# Patient Record
Sex: MALE | Race: WHITE | NOT HISPANIC OR LATINO | Employment: OTHER | URBAN - METROPOLITAN AREA
[De-identification: names, ages, dates, MRNs, and addresses within clinical notes are randomized per-mention and may not be internally consistent; named-entity substitution may affect disease eponyms.]

---

## 2023-10-09 ENCOUNTER — APPOINTMENT (OUTPATIENT)
Dept: ADMISSIONS | Facility: MEDICAL CENTER | Age: 53
End: 2023-10-09
Attending: NEUROLOGICAL SURGERY
Payer: COMMERCIAL

## 2023-10-31 ENCOUNTER — PRE-ADMISSION TESTING (OUTPATIENT)
Dept: ADMISSIONS | Facility: MEDICAL CENTER | Age: 53
End: 2023-10-31
Attending: NEUROLOGICAL SURGERY
Payer: COMMERCIAL

## 2023-10-31 RX ORDER — OXYCODONE AND ACETAMINOPHEN 10; 325 MG/1; MG/1
1 TABLET ORAL 4 TIMES DAILY PRN
COMMUNITY
Start: 2023-09-29

## 2023-10-31 RX ORDER — PREGABALIN 150 MG/1
150 CAPSULE ORAL
COMMUNITY
Start: 2023-09-29

## 2023-10-31 RX ORDER — MORPHINE SULFATE 30 MG/1
30 TABLET, FILM COATED, EXTENDED RELEASE ORAL 2 TIMES DAILY
COMMUNITY
Start: 2023-09-29

## 2023-10-31 RX ORDER — PREGABALIN 150 MG/1
150 CAPSULE ORAL DAILY
COMMUNITY
End: 2023-10-31

## 2023-11-15 ENCOUNTER — APPOINTMENT (OUTPATIENT)
Dept: RADIOLOGY | Facility: MEDICAL CENTER | Age: 53
End: 2023-11-15
Attending: NEUROLOGICAL SURGERY
Payer: COMMERCIAL

## 2023-11-15 ENCOUNTER — ANESTHESIA EVENT (OUTPATIENT)
Dept: SURGERY | Facility: MEDICAL CENTER | Age: 53
End: 2023-11-15
Payer: COMMERCIAL

## 2023-11-15 ENCOUNTER — ANESTHESIA (OUTPATIENT)
Dept: SURGERY | Facility: MEDICAL CENTER | Age: 53
End: 2023-11-15
Payer: COMMERCIAL

## 2023-11-15 ENCOUNTER — HOSPITAL ENCOUNTER (OUTPATIENT)
Facility: MEDICAL CENTER | Age: 53
End: 2023-11-16
Attending: NEUROLOGICAL SURGERY | Admitting: NEUROLOGICAL SURGERY
Payer: COMMERCIAL

## 2023-11-15 PROBLEM — M48.02 CERVICAL STENOSIS OF SPINAL CANAL: Status: ACTIVE | Noted: 2023-11-15

## 2023-11-15 LAB
ABO + RH BLD: NORMAL
ABO GROUP BLD: NORMAL
BLD GP AB SCN SERPL QL: NORMAL
RH BLD: NORMAL

## 2023-11-15 PROCEDURE — 700111 HCHG RX REV CODE 636 W/ 250 OVERRIDE (IP): Mod: JZ | Performed by: ANESTHESIOLOGY

## 2023-11-15 PROCEDURE — 110454 HCHG SHELL REV 250: Performed by: NEUROLOGICAL SURGERY

## 2023-11-15 PROCEDURE — 700101 HCHG RX REV CODE 250: Performed by: ANESTHESIOLOGY

## 2023-11-15 PROCEDURE — 86900 BLOOD TYPING SEROLOGIC ABO: CPT

## 2023-11-15 PROCEDURE — 700111 HCHG RX REV CODE 636 W/ 250 OVERRIDE (IP): Performed by: ANESTHESIOLOGY

## 2023-11-15 PROCEDURE — A9270 NON-COVERED ITEM OR SERVICE: HCPCS | Performed by: ANESTHESIOLOGY

## 2023-11-15 PROCEDURE — A9270 NON-COVERED ITEM OR SERVICE: HCPCS | Performed by: NURSE PRACTITIONER

## 2023-11-15 PROCEDURE — 700105 HCHG RX REV CODE 258: Performed by: NEUROLOGICAL SURGERY

## 2023-11-15 PROCEDURE — 700102 HCHG RX REV CODE 250 W/ 637 OVERRIDE(OP): Performed by: ANESTHESIOLOGY

## 2023-11-15 PROCEDURE — C1713 ANCHOR/SCREW BN/BN,TIS/BN: HCPCS | Performed by: NEUROLOGICAL SURGERY

## 2023-11-15 PROCEDURE — 160048 HCHG OR STATISTICAL LEVEL 1-5: Performed by: NEUROLOGICAL SURGERY

## 2023-11-15 PROCEDURE — 700111 HCHG RX REV CODE 636 W/ 250 OVERRIDE (IP): Mod: JZ | Performed by: NEUROLOGICAL SURGERY

## 2023-11-15 PROCEDURE — 86850 RBC ANTIBODY SCREEN: CPT

## 2023-11-15 PROCEDURE — 700101 HCHG RX REV CODE 250: Performed by: NURSE PRACTITIONER

## 2023-11-15 PROCEDURE — 160036 HCHG PACU - EA ADDL 30 MINS PHASE I: Performed by: NEUROLOGICAL SURGERY

## 2023-11-15 PROCEDURE — 160035 HCHG PACU - 1ST 60 MINS PHASE I: Performed by: NEUROLOGICAL SURGERY

## 2023-11-15 PROCEDURE — 160041 HCHG SURGERY MINUTES - EA ADDL 1 MIN LEVEL 4: Performed by: NEUROLOGICAL SURGERY

## 2023-11-15 PROCEDURE — 700102 HCHG RX REV CODE 250 W/ 637 OVERRIDE(OP): Performed by: NURSE PRACTITIONER

## 2023-11-15 PROCEDURE — 700101 HCHG RX REV CODE 250: Performed by: NEUROLOGICAL SURGERY

## 2023-11-15 PROCEDURE — 72040 X-RAY EXAM NECK SPINE 2-3 VW: CPT

## 2023-11-15 PROCEDURE — 160002 HCHG RECOVERY MINUTES (STAT): Performed by: NEUROLOGICAL SURGERY

## 2023-11-15 PROCEDURE — 160029 HCHG SURGERY MINUTES - 1ST 30 MINS LEVEL 4: Performed by: NEUROLOGICAL SURGERY

## 2023-11-15 PROCEDURE — 96365 THER/PROPH/DIAG IV INF INIT: CPT

## 2023-11-15 PROCEDURE — 160009 HCHG ANES TIME/MIN: Performed by: NEUROLOGICAL SURGERY

## 2023-11-15 PROCEDURE — 700111 HCHG RX REV CODE 636 W/ 250 OVERRIDE (IP): Performed by: NURSE PRACTITIONER

## 2023-11-15 PROCEDURE — 110371 HCHG SHELL REV 272: Performed by: NEUROLOGICAL SURGERY

## 2023-11-15 PROCEDURE — 86901 BLOOD TYPING SEROLOGIC RH(D): CPT

## 2023-11-15 PROCEDURE — 700105 HCHG RX REV CODE 258: Performed by: NURSE PRACTITIONER

## 2023-11-15 PROCEDURE — G0378 HOSPITAL OBSERVATION PER HR: HCPCS

## 2023-11-15 DEVICE — DISC 6 X 16 MM PRESTIGE: Type: IMPLANTABLE DEVICE | Status: FUNCTIONAL

## 2023-11-15 RX ORDER — DIPHENHYDRAMINE HYDROCHLORIDE 50 MG/ML
12.5 INJECTION INTRAMUSCULAR; INTRAVENOUS
Status: DISCONTINUED | OUTPATIENT
Start: 2023-11-15 | End: 2023-11-15 | Stop reason: HOSPADM

## 2023-11-15 RX ORDER — HYDROMORPHONE HYDROCHLORIDE 1 MG/ML
0.1 INJECTION, SOLUTION INTRAMUSCULAR; INTRAVENOUS; SUBCUTANEOUS
Status: DISCONTINUED | OUTPATIENT
Start: 2023-11-15 | End: 2023-11-15 | Stop reason: HOSPADM

## 2023-11-15 RX ORDER — MIDAZOLAM HYDROCHLORIDE 1 MG/ML
INJECTION INTRAMUSCULAR; INTRAVENOUS PRN
Status: DISCONTINUED | OUTPATIENT
Start: 2023-11-15 | End: 2023-11-15 | Stop reason: SURG

## 2023-11-15 RX ORDER — LEVOTHYROXINE SODIUM 0.1 MG/1
100 TABLET ORAL DAILY
Status: DISCONTINUED | OUTPATIENT
Start: 2023-11-16 | End: 2023-11-16 | Stop reason: HOSPADM

## 2023-11-15 RX ORDER — SODIUM CHLORIDE, SODIUM LACTATE, POTASSIUM CHLORIDE, CALCIUM CHLORIDE 600; 310; 30; 20 MG/100ML; MG/100ML; MG/100ML; MG/100ML
INJECTION, SOLUTION INTRAVENOUS CONTINUOUS
Status: ACTIVE | OUTPATIENT
Start: 2023-11-15 | End: 2023-11-15

## 2023-11-15 RX ORDER — LABETALOL HYDROCHLORIDE 5 MG/ML
10 INJECTION, SOLUTION INTRAVENOUS
Status: DISCONTINUED | OUTPATIENT
Start: 2023-11-15 | End: 2023-11-16 | Stop reason: HOSPADM

## 2023-11-15 RX ORDER — ONDANSETRON 4 MG/1
4 TABLET, ORALLY DISINTEGRATING ORAL EVERY 4 HOURS PRN
Status: DISCONTINUED | OUTPATIENT
Start: 2023-11-15 | End: 2023-11-16 | Stop reason: HOSPADM

## 2023-11-15 RX ORDER — DEXAMETHASONE SODIUM PHOSPHATE 4 MG/ML
INJECTION, SOLUTION INTRA-ARTICULAR; INTRALESIONAL; INTRAMUSCULAR; INTRAVENOUS; SOFT TISSUE PRN
Status: DISCONTINUED | OUTPATIENT
Start: 2023-11-15 | End: 2023-11-15 | Stop reason: SURG

## 2023-11-15 RX ORDER — PROMETHAZINE HYDROCHLORIDE 25 MG/1
12.5-25 TABLET ORAL EVERY 4 HOURS PRN
Status: DISCONTINUED | OUTPATIENT
Start: 2023-11-15 | End: 2023-11-16 | Stop reason: HOSPADM

## 2023-11-15 RX ORDER — DOCUSATE SODIUM 100 MG/1
100 CAPSULE, LIQUID FILLED ORAL 2 TIMES DAILY
Status: DISCONTINUED | OUTPATIENT
Start: 2023-11-15 | End: 2023-11-16 | Stop reason: HOSPADM

## 2023-11-15 RX ORDER — HYDRALAZINE HYDROCHLORIDE 20 MG/ML
5 INJECTION INTRAMUSCULAR; INTRAVENOUS
Status: DISCONTINUED | OUTPATIENT
Start: 2023-11-15 | End: 2023-11-15 | Stop reason: HOSPADM

## 2023-11-15 RX ORDER — ONDANSETRON 2 MG/ML
4 INJECTION INTRAMUSCULAR; INTRAVENOUS EVERY 4 HOURS PRN
Status: DISCONTINUED | OUTPATIENT
Start: 2023-11-15 | End: 2023-11-16 | Stop reason: HOSPADM

## 2023-11-15 RX ORDER — HYDROMORPHONE HYDROCHLORIDE 2 MG/ML
INJECTION, SOLUTION INTRAMUSCULAR; INTRAVENOUS; SUBCUTANEOUS PRN
Status: DISCONTINUED | OUTPATIENT
Start: 2023-11-15 | End: 2023-11-15 | Stop reason: SURG

## 2023-11-15 RX ORDER — TIZANIDINE 4 MG/1
4 TABLET ORAL 3 TIMES DAILY PRN
Status: DISCONTINUED | OUTPATIENT
Start: 2023-11-15 | End: 2023-11-16 | Stop reason: HOSPADM

## 2023-11-15 RX ORDER — HALOPERIDOL 5 MG/ML
1 INJECTION INTRAMUSCULAR
Status: DISCONTINUED | OUTPATIENT
Start: 2023-11-15 | End: 2023-11-15 | Stop reason: HOSPADM

## 2023-11-15 RX ORDER — HYDROMORPHONE HYDROCHLORIDE 1 MG/ML
0.2 INJECTION, SOLUTION INTRAMUSCULAR; INTRAVENOUS; SUBCUTANEOUS
Status: DISCONTINUED | OUTPATIENT
Start: 2023-11-15 | End: 2023-11-15 | Stop reason: HOSPADM

## 2023-11-15 RX ORDER — CEFAZOLIN SODIUM 1 G/3ML
INJECTION, POWDER, FOR SOLUTION INTRAMUSCULAR; INTRAVENOUS PRN
Status: DISCONTINUED | OUTPATIENT
Start: 2023-11-15 | End: 2023-11-15 | Stop reason: SURG

## 2023-11-15 RX ORDER — MEPERIDINE HYDROCHLORIDE 25 MG/ML
6.25 INJECTION INTRAMUSCULAR; INTRAVENOUS; SUBCUTANEOUS
Status: DISCONTINUED | OUTPATIENT
Start: 2023-11-15 | End: 2023-11-15 | Stop reason: HOSPADM

## 2023-11-15 RX ORDER — GABAPENTIN 300 MG/1
300 CAPSULE ORAL ONCE
Status: COMPLETED | OUTPATIENT
Start: 2023-11-15 | End: 2023-11-15

## 2023-11-15 RX ORDER — EPHEDRINE SULFATE 50 MG/ML
5 INJECTION, SOLUTION INTRAVENOUS
Status: DISCONTINUED | OUTPATIENT
Start: 2023-11-15 | End: 2023-11-15 | Stop reason: HOSPADM

## 2023-11-15 RX ORDER — ONDANSETRON 2 MG/ML
INJECTION INTRAMUSCULAR; INTRAVENOUS PRN
Status: DISCONTINUED | OUTPATIENT
Start: 2023-11-15 | End: 2023-11-15 | Stop reason: SURG

## 2023-11-15 RX ORDER — MORPHINE SULFATE 15 MG/1
30 TABLET, FILM COATED, EXTENDED RELEASE ORAL 2 TIMES DAILY
Status: DISCONTINUED | OUTPATIENT
Start: 2023-11-15 | End: 2023-11-16 | Stop reason: HOSPADM

## 2023-11-15 RX ORDER — METOPROLOL TARTRATE 1 MG/ML
1 INJECTION, SOLUTION INTRAVENOUS
Status: DISCONTINUED | OUTPATIENT
Start: 2023-11-15 | End: 2023-11-15 | Stop reason: HOSPADM

## 2023-11-15 RX ORDER — DIPHENHYDRAMINE HCL 25 MG
25 TABLET ORAL EVERY 6 HOURS PRN
Status: DISCONTINUED | OUTPATIENT
Start: 2023-11-15 | End: 2023-11-16 | Stop reason: HOSPADM

## 2023-11-15 RX ORDER — PROMETHAZINE HYDROCHLORIDE 25 MG/1
12.5-25 SUPPOSITORY RECTAL EVERY 4 HOURS PRN
Status: DISCONTINUED | OUTPATIENT
Start: 2023-11-15 | End: 2023-11-16 | Stop reason: HOSPADM

## 2023-11-15 RX ORDER — HYDROMORPHONE HYDROCHLORIDE 1 MG/ML
0.4 INJECTION, SOLUTION INTRAMUSCULAR; INTRAVENOUS; SUBCUTANEOUS
Status: DISCONTINUED | OUTPATIENT
Start: 2023-11-15 | End: 2023-11-15 | Stop reason: HOSPADM

## 2023-11-15 RX ORDER — OXYCODONE HYDROCHLORIDE 10 MG/1
10 TABLET ORAL
Status: DISCONTINUED | OUTPATIENT
Start: 2023-11-15 | End: 2023-11-16 | Stop reason: HOSPADM

## 2023-11-15 RX ORDER — CEFAZOLIN SODIUM 1 G/3ML
INJECTION, POWDER, FOR SOLUTION INTRAMUSCULAR; INTRAVENOUS
Status: DISCONTINUED | OUTPATIENT
Start: 2023-11-15 | End: 2023-11-15 | Stop reason: HOSPADM

## 2023-11-15 RX ORDER — OXYCODONE HCL 5 MG/5 ML
10 SOLUTION, ORAL ORAL
Status: COMPLETED | OUTPATIENT
Start: 2023-11-15 | End: 2023-11-15

## 2023-11-15 RX ORDER — OXYCODONE HCL 5 MG/5 ML
5 SOLUTION, ORAL ORAL
Status: COMPLETED | OUTPATIENT
Start: 2023-11-15 | End: 2023-11-15

## 2023-11-15 RX ORDER — SODIUM CHLORIDE AND POTASSIUM CHLORIDE 150; 900 MG/100ML; MG/100ML
INJECTION, SOLUTION INTRAVENOUS CONTINUOUS
Status: DISCONTINUED | OUTPATIENT
Start: 2023-11-15 | End: 2023-11-16 | Stop reason: HOSPADM

## 2023-11-15 RX ORDER — LIDOCAINE HYDROCHLORIDE 20 MG/ML
INJECTION, SOLUTION EPIDURAL; INFILTRATION; INTRACAUDAL; PERINEURAL PRN
Status: DISCONTINUED | OUTPATIENT
Start: 2023-11-15 | End: 2023-11-15 | Stop reason: SURG

## 2023-11-15 RX ORDER — BUPIVACAINE HYDROCHLORIDE AND EPINEPHRINE 5; 5 MG/ML; UG/ML
INJECTION, SOLUTION PERINEURAL
Status: DISCONTINUED | OUTPATIENT
Start: 2023-11-15 | End: 2023-11-15 | Stop reason: HOSPADM

## 2023-11-15 RX ORDER — ROCURONIUM BROMIDE 10 MG/ML
INJECTION, SOLUTION INTRAVENOUS PRN
Status: DISCONTINUED | OUTPATIENT
Start: 2023-11-15 | End: 2023-11-15 | Stop reason: SURG

## 2023-11-15 RX ORDER — AMOXICILLIN 250 MG
1 CAPSULE ORAL
Status: DISCONTINUED | OUTPATIENT
Start: 2023-11-15 | End: 2023-11-16 | Stop reason: HOSPADM

## 2023-11-15 RX ORDER — ACETAMINOPHEN 500 MG
1000 TABLET ORAL ONCE
Status: COMPLETED | OUTPATIENT
Start: 2023-11-15 | End: 2023-11-15

## 2023-11-15 RX ORDER — ENEMA 19; 7 G/133ML; G/133ML
1 ENEMA RECTAL
Status: DISCONTINUED | OUTPATIENT
Start: 2023-11-15 | End: 2023-11-16 | Stop reason: HOSPADM

## 2023-11-15 RX ORDER — POLYETHYLENE GLYCOL 3350 17 G/17G
1 POWDER, FOR SOLUTION ORAL 2 TIMES DAILY PRN
Status: DISCONTINUED | OUTPATIENT
Start: 2023-11-15 | End: 2023-11-16 | Stop reason: HOSPADM

## 2023-11-15 RX ORDER — LEVOTHYROXINE SODIUM 0.1 MG/1
100 TABLET ORAL DAILY
COMMUNITY
Start: 2023-11-06

## 2023-11-15 RX ORDER — MAGNESIUM HYDROXIDE 1200 MG/15ML
LIQUID ORAL
Status: COMPLETED | OUTPATIENT
Start: 2023-11-15 | End: 2023-11-15

## 2023-11-15 RX ORDER — PROCHLORPERAZINE EDISYLATE 5 MG/ML
5-10 INJECTION INTRAMUSCULAR; INTRAVENOUS EVERY 4 HOURS PRN
Status: DISCONTINUED | OUTPATIENT
Start: 2023-11-15 | End: 2023-11-16 | Stop reason: HOSPADM

## 2023-11-15 RX ORDER — SODIUM CHLORIDE, SODIUM LACTATE, POTASSIUM CHLORIDE, CALCIUM CHLORIDE 600; 310; 30; 20 MG/100ML; MG/100ML; MG/100ML; MG/100ML
INJECTION, SOLUTION INTRAVENOUS CONTINUOUS
Status: DISCONTINUED | OUTPATIENT
Start: 2023-11-15 | End: 2023-11-15 | Stop reason: HOSPADM

## 2023-11-15 RX ORDER — ONDANSETRON 2 MG/ML
4 INJECTION INTRAMUSCULAR; INTRAVENOUS
Status: DISCONTINUED | OUTPATIENT
Start: 2023-11-15 | End: 2023-11-15 | Stop reason: HOSPADM

## 2023-11-15 RX ORDER — HYDRALAZINE HYDROCHLORIDE 20 MG/ML
10 INJECTION INTRAMUSCULAR; INTRAVENOUS
Status: DISCONTINUED | OUTPATIENT
Start: 2023-11-15 | End: 2023-11-16 | Stop reason: HOSPADM

## 2023-11-15 RX ORDER — PREGABALIN 150 MG/1
150 CAPSULE ORAL
Status: DISCONTINUED | OUTPATIENT
Start: 2023-11-15 | End: 2023-11-16 | Stop reason: HOSPADM

## 2023-11-15 RX ORDER — AMOXICILLIN 250 MG
1 CAPSULE ORAL NIGHTLY
Status: DISCONTINUED | OUTPATIENT
Start: 2023-11-15 | End: 2023-11-16 | Stop reason: HOSPADM

## 2023-11-15 RX ORDER — OXYCODONE HYDROCHLORIDE 10 MG/1
20 TABLET ORAL
Status: DISCONTINUED | OUTPATIENT
Start: 2023-11-15 | End: 2023-11-16 | Stop reason: HOSPADM

## 2023-11-15 RX ORDER — HYDROMORPHONE HYDROCHLORIDE 1 MG/ML
1 INJECTION, SOLUTION INTRAMUSCULAR; INTRAVENOUS; SUBCUTANEOUS
Status: DISCONTINUED | OUTPATIENT
Start: 2023-11-15 | End: 2023-11-16 | Stop reason: HOSPADM

## 2023-11-15 RX ORDER — DIPHENHYDRAMINE HYDROCHLORIDE 50 MG/ML
25 INJECTION INTRAMUSCULAR; INTRAVENOUS EVERY 6 HOURS PRN
Status: DISCONTINUED | OUTPATIENT
Start: 2023-11-15 | End: 2023-11-16 | Stop reason: HOSPADM

## 2023-11-15 RX ORDER — BISACODYL 10 MG
10 SUPPOSITORY, RECTAL RECTAL
Status: DISCONTINUED | OUTPATIENT
Start: 2023-11-15 | End: 2023-11-16 | Stop reason: HOSPADM

## 2023-11-15 RX ADMIN — FENTANYL CITRATE 50 MCG: 50 INJECTION, SOLUTION INTRAMUSCULAR; INTRAVENOUS at 14:56

## 2023-11-15 RX ADMIN — GABAPENTIN 300 MG: 300 CAPSULE ORAL at 13:29

## 2023-11-15 RX ADMIN — SODIUM CHLORIDE, POTASSIUM CHLORIDE, SODIUM LACTATE AND CALCIUM CHLORIDE: 600; 310; 30; 20 INJECTION, SOLUTION INTRAVENOUS at 13:29

## 2023-11-15 RX ADMIN — ONDANSETRON 4 MG: 2 INJECTION INTRAMUSCULAR; INTRAVENOUS at 15:31

## 2023-11-15 RX ADMIN — FENTANYL CITRATE 50 MCG: 50 INJECTION, SOLUTION INTRAMUSCULAR; INTRAVENOUS at 16:30

## 2023-11-15 RX ADMIN — MORPHINE SULFATE 30 MG: 15 TABLET, FILM COATED, EXTENDED RELEASE ORAL at 21:05

## 2023-11-15 RX ADMIN — ACETAMINOPHEN 1000 MG: 500 TABLET, FILM COATED ORAL at 13:29

## 2023-11-15 RX ADMIN — FENTANYL CITRATE 50 MCG: 50 INJECTION, SOLUTION INTRAMUSCULAR; INTRAVENOUS at 14:59

## 2023-11-15 RX ADMIN — LIDOCAINE HYDROCHLORIDE 50 MG: 20 INJECTION, SOLUTION EPIDURAL; INFILTRATION; INTRACAUDAL at 14:39

## 2023-11-15 RX ADMIN — SUGAMMADEX 200 MG: 100 INJECTION, SOLUTION INTRAVENOUS at 15:41

## 2023-11-15 RX ADMIN — ROCURONIUM BROMIDE 50 MG: 50 INJECTION, SOLUTION INTRAVENOUS at 14:39

## 2023-11-15 RX ADMIN — POTASSIUM CHLORIDE AND SODIUM CHLORIDE: 900; 150 INJECTION, SOLUTION INTRAVENOUS at 23:05

## 2023-11-15 RX ADMIN — PROPOFOL 20 MG: 10 INJECTION, EMULSION INTRAVENOUS at 14:59

## 2023-11-15 RX ADMIN — FENTANYL CITRATE 50 MCG: 50 INJECTION, SOLUTION INTRAMUSCULAR; INTRAVENOUS at 16:41

## 2023-11-15 RX ADMIN — PROPOFOL 160 MG: 10 INJECTION, EMULSION INTRAVENOUS at 14:39

## 2023-11-15 RX ADMIN — OXYCODONE HYDROCHLORIDE 10 MG: 5 SOLUTION ORAL at 16:29

## 2023-11-15 RX ADMIN — CEFAZOLIN 2 G: 2 INJECTION, POWDER, FOR SOLUTION INTRAMUSCULAR; INTRAVENOUS at 23:07

## 2023-11-15 RX ADMIN — SUGAMMADEX 200 MG: 100 INJECTION, SOLUTION INTRAVENOUS at 15:44

## 2023-11-15 RX ADMIN — PREGABALIN 150 MG: 150 CAPSULE ORAL at 21:07

## 2023-11-15 RX ADMIN — HYDROMORPHONE HYDROCHLORIDE 2 MG: 2 INJECTION INTRAMUSCULAR; INTRAVENOUS; SUBCUTANEOUS at 15:06

## 2023-11-15 RX ADMIN — DEXAMETHASONE SODIUM PHOSPHATE 8 MG: 4 INJECTION INTRA-ARTICULAR; INTRALESIONAL; INTRAMUSCULAR; INTRAVENOUS; SOFT TISSUE at 14:54

## 2023-11-15 RX ADMIN — CEFAZOLIN 2 G: 1 INJECTION, POWDER, FOR SOLUTION INTRAMUSCULAR; INTRAVENOUS at 14:47

## 2023-11-15 RX ADMIN — MIDAZOLAM 2 MG: 1 INJECTION, SOLUTION INTRAMUSCULAR; INTRAVENOUS at 14:37

## 2023-11-15 ASSESSMENT — PAIN SCALES - GENERAL: PAIN_LEVEL: 6

## 2023-11-15 ASSESSMENT — LIFESTYLE VARIABLES
EVER FELT BAD OR GUILTY ABOUT YOUR DRINKING: NO
HOW MANY TIMES IN THE PAST YEAR HAVE YOU HAD 5 OR MORE DRINKS IN A DAY: 0
HAVE YOU EVER FELT YOU SHOULD CUT DOWN ON YOUR DRINKING: NO
EVER HAD A DRINK FIRST THING IN THE MORNING TO STEADY YOUR NERVES TO GET RID OF A HANGOVER: NO
TOTAL SCORE: 0
AVERAGE NUMBER OF DAYS PER WEEK YOU HAVE A DRINK CONTAINING ALCOHOL: 0
CONSUMPTION TOTAL: NEGATIVE
HAVE PEOPLE ANNOYED YOU BY CRITICIZING YOUR DRINKING: NO
TOTAL SCORE: 0
ON A TYPICAL DAY WHEN YOU DRINK ALCOHOL HOW MANY DRINKS DO YOU HAVE: 0
DOES PATIENT WANT TO STOP DRINKING: NO
TOTAL SCORE: 0

## 2023-11-15 ASSESSMENT — PAIN DESCRIPTION - PAIN TYPE
TYPE: SURGICAL PAIN

## 2023-11-15 ASSESSMENT — FIBROSIS 4 INDEX: FIB4 SCORE: 0.58

## 2023-11-15 NOTE — OR SURGEON
Immediate Post OP Note    PreOp Diagnosis: Cervical stenosis       PostOp Diagnosis: same       Procedure(s):  C6-7 ANTERIOR PLACEMENT OF ARTIFICAL DISC - Wound Class: Clean with Drain    Surgeon(s):  Peter Fung M.D.    Anesthesiologist/Type of Anesthesia:  Anesthesiologist: Oseas Mishra D.O.; Crow Rosario M.D./General    Surgical Staff:  Assistant: RAMANA Morfin  Circulator: Alisson Sanchez R.N.  Scrub Person: Chloe Walter  Radiology Technologist: Maria Guadalupe Blanco    Specimens removed if any:  * No specimens in log *    Estimated Blood Loss: >30 cc    Findings: good decompression, good hardware placement     Complications: none         11/15/2023 3:47 PM RAMANA Morfin

## 2023-11-15 NOTE — ANESTHESIA PREPROCEDURE EVALUATION
Case: [id number] Date/Time: 11/15/23 1515    Procedure: C6-7 ANTERIOR PLACEMENT OF ARTIFICAL DISC    Pre-op diagnosis: CERVICAL RADICULOPATHY    Location: [facility] OR 05 / SURGERY [facility]    Surgeons: Peter Fung M.D.        Chron  Chronic Pain- on MS bid  Hypothyroid  Relevant Problems   No relevant active problems       Physical Exam    Airway   Mallampati: II  TM distance: >3 FB  Neck ROM: limited       Cardiovascular - normal exam  Rhythm: regular  Rate: normal  (-) murmur     Dental - normal exam           Pulmonary - normal exam  Breath sounds clear to auscultation     Abdominal    Neurological - normal exam         Other findings: Prominent upper teeth, dental caries              Anesthesia Plan    ASA 2       Plan - general       Airway plan will be ETT          Induction: intravenous    Postoperative Plan: Postoperative administration of opioids is intended.    Pertinent diagnostic labs and testing reviewed    Informed Consent:    Anesthetic plan and risks discussed with patient.    Use of blood products discussed with: patient whom consented to blood products.

## 2023-11-15 NOTE — PROGRESS NOTES
Medication history reviewed with PT at bedside    [facility] rec is complete per PT reporting    Allergies reviewed.     Patient denies any outpatient antibiotics in the last 30 days.     Patient is not taking anticoagulants.    Preferred pharmacy for this visit - [facility] ([phone]).

## 2023-11-15 NOTE — ANESTHESIA PROCEDURE NOTES
Airway    Date/Time: 11/15/2023 2:41 PM    Performed by: Crow Rosario M.D.  Authorized by: Crow Rosario M.D.    Location:  OR  Urgency:  Elective  Indications for Airway Management:  Anesthesia      Spontaneous Ventilation: absent    Sedation Level:  Deep  Preoxygenated: Yes    Patient Position:  Sniffing  Mask Difficulty Assessment:  1 - vent by mask  Final Airway Type:  Endotracheal airway  Final Endotracheal Airway:  ETT  Cuffed: Yes    Technique Used for Successful ETT Placement:  Video laryngoscopy  Devices/Methods Used in Placement:  Intubating stylet and cricoid pressure    Insertion Site:  Oral  Blade Type:  Glide  Laryngoscope Blade/Videolaryngoscope Blade Size:  3  ETT Size (mm):  8.0  Measured from:  Teeth  ETT to Teeth (cm):  22  Placement Verified by: auscultation and capnometry    Cormack-Lehane Classification:  Grade I - full view of glottis  Number of Attempts at Approach:  1   Teeth, including prominent upper front cap, intact .  AT intubation

## 2023-11-15 NOTE — OR NURSING
Patient arrived to PACU from OR in stable condition.   Report received at 1551.   VSS and initial assessment complete.   1630 medicated per orders  1700 resting comfortably. Ready for floor status when room available. Tolerating PO.  [id number] family at bedside.

## 2023-11-16 VITALS
SYSTOLIC BLOOD PRESSURE: 139 MMHG | RESPIRATION RATE: 16 BRPM | WEIGHT: 195.11 LBS | HEART RATE: 87 BPM | DIASTOLIC BLOOD PRESSURE: 87 MMHG | OXYGEN SATURATION: 90 % | BODY MASS INDEX: 26.43 KG/M2 | HEIGHT: 72 IN | TEMPERATURE: 99.5 F

## 2023-11-16 PROCEDURE — G0378 HOSPITAL OBSERVATION PER HR: HCPCS

## 2023-11-16 PROCEDURE — A9270 NON-COVERED ITEM OR SERVICE: HCPCS | Performed by: NURSE PRACTITIONER

## 2023-11-16 PROCEDURE — 700105 HCHG RX REV CODE 258: Performed by: NURSE PRACTITIONER

## 2023-11-16 PROCEDURE — 700102 HCHG RX REV CODE 250 W/ 637 OVERRIDE(OP): Performed by: NURSE PRACTITIONER

## 2023-11-16 PROCEDURE — 700111 HCHG RX REV CODE 636 W/ 250 OVERRIDE (IP): Performed by: NURSE PRACTITIONER

## 2023-11-16 PROCEDURE — 97165 OT EVAL LOW COMPLEX 30 MIN: CPT

## 2023-11-16 RX ADMIN — CEFAZOLIN 2 G: 2 INJECTION, POWDER, FOR SOLUTION INTRAMUSCULAR; INTRAVENOUS at 05:20

## 2023-11-16 RX ADMIN — MORPHINE SULFATE 30 MG: 15 TABLET, FILM COATED, EXTENDED RELEASE ORAL at 05:19

## 2023-11-16 RX ADMIN — LEVOTHYROXINE SODIUM 100 MCG: 0.1 TABLET ORAL at 05:18

## 2023-11-16 ASSESSMENT — COGNITIVE AND FUNCTIONAL STATUS - GENERAL
SUGGESTED CMS G CODE MODIFIER DAILY ACTIVITY: CJ
TOILETING: A LITTLE
HELP NEEDED FOR BATHING: A LITTLE
DRESSING REGULAR LOWER BODY CLOTHING: A LITTLE
DAILY ACTIVITIY SCORE: 21

## 2023-11-16 ASSESSMENT — ACTIVITIES OF DAILY LIVING (ADL): TOILETING: INDEPENDENT

## 2023-11-16 NOTE — ANESTHESIA POSTPROCEDURE EVALUATION
Patient: Shanda Melgoza    Procedure Summary       Date: 11/15/23 Room / Location: [facility] 05 / SURGERY [facility]    Anesthesia Start: 1434 Anesthesia Stop: 1554    Procedure: C6-7 ANTERIOR PLACEMENT OF ARTIFICAL DISC (Spine Cervical) Diagnosis: (CERVICAL RADICULOPATHY)    Surgeons: Peter Fung M.D. Responsible Provider: Oseas Mishra D.O.    Anesthesia Type: general ASA Status: 2            Final Anesthesia Type: general  Last vitals  BP   Blood Pressure: 117/58    Temp   36.3 °C (97.3 °F)    Pulse   89   Resp   20    SpO2   96 %      Anesthesia Post Evaluation    Patient location during evaluation: PACU  Patient participation: complete - patient participated  Level of consciousness: awake and alert  Pain score: 6    Airway patency: patent  Anesthetic complications: no  Cardiovascular status: hemodynamically stable  Respiratory status: acceptable  Hydration status: euvolemic    PONV: none          No notable events documented.     Nurse Pain Score: 8 (NPRS)

## 2023-11-16 NOTE — ANESTHESIA TIME REPORT
Anesthesia Start and Stop Event Times       Date Time Event    11/15/2023 1434 Ready for Procedure     1434 Anesthesia Start     1554 Anesthesia Stop          Responsible Staff  11/15/23      Name Role Begin End    Crow Rosario M.D. Anesth 1434 1504    Oseas Mishra D.O. Anesth 1504 1554          Overtime Reason:  no overtime (within assigned shift)    Comments:

## 2023-11-16 NOTE — PROGRESS NOTES
4 Eyes Skin Assessment Completed by DELMI vang and DELMI Rosado.    Head WDL  Ears WDL  Nose WDL  Mouth WDL  Neck Incision, dermabond applied, open to air  Breast/Chest hemovac, gauze and tegaderm on  Shoulder Blades WDL  Spine WDL  (R) Arm/Elbow/Hand WDL  (L) Arm/Elbow/Hand WDL  Abdomen WDL  Groin WDL  Scrotum/Coccyx/Buttocks WDL  (R) Leg WDL  (L) Leg WDL  (R) Heel/Foot/Toe WDL  (L) Heel/Foot/Toe WDL          Devices In Places Pulse Ox and SCD's      Interventions In Place Pillows    Possible Skin Injury No    Pictures Uploaded Into Epic N/A  Wound Consult Placed N/A  RN Wound Prevention Protocol Ordered Yes

## 2023-11-16 NOTE — THERAPY
Physical Therapy Contact Note    Patient Name: Shanda Melgoza  Age:  53 y.o., Sex:  male  Medical Record #: [id number]  Today's Date: 11/16/2023    PT eval attempted, pt declining needs. Pt up self in room, eager to d/c home, no concerns about mobility. Received cervical handout and education from OT. Will sign off.     Salvador Powell PT, DPT

## 2023-11-16 NOTE — DISCHARGE INSTRUCTIONS
Tomorrow remove dressing from drain site then Ok to shower, pat incision dry, leave open to air- no dressing   No Aspirin or non-steroidal anti-inflammatory medications (aleve, motrin, ibuprofen)  No driving while on narcotic medication  Over the counter stool softeners daily while on narcotics  No lifting greater than 10 pounds, no repetitive motion above shoulder level  Follow up at [facility] 2 weeks after surgery      Cervical Disk Replacement, Care After  This sheet gives you information about how to care for yourself after your procedure. Your health care provider may also give you more specific instructions. If you have problems or questions, contact your health care provider.  What can I expect after the procedure?  After the procedure, it is common to have:  Pain or soreness.  Some pain, burning, or tingling in one or both arms.  Stiffness.  A hoarse voice.  Constipation.  Decreased appetite.  It may take 6-12 weeks for you to fully recover. You will need to wear a neck collar (cervical collar) to support your neck and keep it from moving. Hard collars are usually worn for about 6-12 weeks. Soft collars are usually worn for about 2 weeks. This may vary among health care providers and hospitals.  Follow these instructions at home:  Medicines  Take over-the-counter and prescription medicines only as told by your health care provider.  Ask your health care provider if the medicine prescribed to you:  Requires you to avoid driving or using heavy machinery.  Can cause constipation. You may need to take these actions to prevent or treat constipation:  Drink enough fluid to keep your urine pale yellow.  Take over-the-counter or prescription medicines.  Eat foods that are high in fiber, such as beans, whole grains, and fresh fruits and vegetables.  Limit foods that are high in fat and processed sugars, such as fried and sweet foods.  If you have a cervical collar:  Wear it as told by your health care  provider. Do not remove it unless told to do so by your health care provider.  Ask your health care provider before making any adjustments to your collar. Small adjustments may be needed over time to improve comfort and reduce pressure on your chin or on the back of your head.  If you are allowed to remove the collar for cleaning and bathing:  Follow instructions from your health care provider about how to safely remove the collar.  Wash and thoroughly dry the skin on your neck. Check your skin for irritation or sores. If you see any, tell your health care provider.  Keep your collar clean by wiping it with mild soap and water and letting it air-dry completely.  If your collar has removable pads, hand-wash them with soap and water and let them air-dry completely.  While your collar is off, do not bring your head to your chest (flex your neck) or lift your chin up high and away from your chest (extend your neck).  Keep long hair outside of the collar.  If your cervical collar is not waterproof:  Do not let it get wet.  Cover it with a watertight covering when you take a bath or shower.  Incision care    Follow instructions from your health care provider about how to take care of your incision. Make sure you:  Wash your hands with soap and water before and after you change your bandage (dressing). If soap and water are not available, use hand .  Change your dressing as told by your health care provider.  Leave stitches (sutures), skin glue, or adhesive strips in place. These skin closures may need to stay in place for 2 weeks or longer. If adhesive strip edges start to loosen and curl up, you may trim the loose edges. Do not remove adhesive strips completely unless your health care provider tells you to do that.  Do not rub the incision area.  Do not get water directly onto your incision area.  Check your incision area every day for signs of infection. Check for:  More redness, swelling, or pain.  Fluid or  blood.  Warmth.  Pus or a bad smell.  Managing pain, stiffness, and swelling    If directed, put ice on the affected area. To do this:  Do not remove your cervical collar unless told to do so by your health care provider.  Put ice in a plastic bag.  Place a towel between your skin and the bag or between your cervical collar and the bag.  Leave the ice on for 20 minutes, 2-3 times a day.  Activity    Rest as told by your health care provider.  Avoid sitting for a long time without moving. Get up to take short walks every 1-2 hours. This is important to improve blood flow and breathing. Ask for help if you feel weak or unsteady.  Return to your normal activities as told by your health care provider. Ask your health care provider what activities are safe for you.  Avoid activities that require jumping or a lot of energy for 6-12 weeks, or as long as told by your health care provider. This includes jumping, aerobics, dancing, and most sports.  Do not lift anything that is heavier than 3 lb (1.4 kg), or the limit that you are told, until your health care provider says that it is safe.  If physical therapy was prescribed, do exercises as told by your health care provider or physical therapist.  General instructions  Eat a normal, healthy diet.  Do not take baths, swim, or use a hot tub until your health care provider approves. If your health care provider approves, you may take showers starting 1-3 days after your procedure.  If you have a sore throat, you may take lozenges to help relieve soreness. It may help to eat soft foods for a few days.  Do not drive any vehicle until your health care provider approves.  Once you are given approval to drive, wear your cervical collar at all times while driving for as long as told by your health care provider.  Do not use any products that contain nicotine or tobacco, such as cigarettes, e-cigarettes, and chewing tobacco. These can delay healing. If you need help quitting, ask your  health care provider.  Keep all follow-up visits as told by your health care provider. This is important.  Contact a health care provider if:  You have a fever or chills.  You have pain that does not get better with medicine.  You have difficulty:  Swallowing.  Urinating.  Having a bowel movement.  Your voice changes or becomes hoarse.  You have areas of redness or irritation under your cervical collar.  Get help right away if:  You have more redness, swelling, or pain around your incision.  You have fluid or blood coming from your incision.  Your incision feels warm to the touch.  You have pus or a bad smell coming from your incision.  You have pain, burning, or tingling in one or both of your arms that becomes different or gets worse.  You have pain, redness, warmth, or swelling in your lower leg.  You have difficulty breathing.  Summary  After the procedure, it is common to have pain or soreness, stiffness, a hoarse voice, constipation, and a decreased appetite.  Follow instructions from your health care provider about how to take care of your incision.  Follow instructions from your health care provider about wearing your cervical collar.  Rest as told by your health care provider.  This information is not intended to replace advice given to you by your health care provider. Make sure you discuss any questions you have with your health care provider.  Document Revised: 04/13/2023 Document Reviewed: 05/14/2020  Elsevier Patient Education © 2023 [employer]

## 2023-11-16 NOTE — PROGRESS NOTES
Patient A&Ox4. Patient stated pain. Meds administered per MAR.   Hemovac in place  Urinal at the bedside  Water and call light within reach.

## 2023-11-16 NOTE — CARE PLAN
The patient is Stable - Low risk of patient condition declining or worsening    Shift Goals  Clinical Goals: pain control, mobility, safety  Patient Goals: pain management, D/C planning  Family Goals: rest    Progress made toward(s) clinical / shift goals:  yes    Patient is not progressing towards the following goals:

## 2023-11-16 NOTE — DISCHARGE SUMMARY
Neurosurgery discharge summary  Date of admit: 23  Date of discharge: 23  Pre op dx: cervical stenosis  Operation: ACD 6-7 placement of artificial disc by Dr Fung on 11/15/23      Subjective:  Doing well  Neck pain fairly well controlled  Thinks arm s/s are decreased from pre op  Swallowing w/o diff, eating, voiding, amb    Exam:  Str 5/5  Inc c/d flat , hvac out    BP  Min: 104/60  Max: 150/78  Pulse  Av.9  Min: 73  Max: 107  Resp  Av.2  Min: 11  Max: 31  Temp  Av.7 °C (98.1 °F)  Min: 36.3 °C (97.3 °F)  Max: 37.3 °C (99.1 °F)  SpO2  Av.2 %  Min: 88 %  Max: 99 %    No data recorded                      Intake/Output                         11/15/23 0700 - 23 0659 23 0700 - 23 0659     0518-3526 4329-6101 Total 4881-4648 5508-1365 Total                 Intake    P.O.  --  320 320  --  -- --    P.O. -- 320 320 -- -- --    I.V.  1000  -- 1000  --  -- --    Volume (mL) (lactated ringers infusion) 1000 -- 1000 -- -- --    Total Intake 2737 151 2761 -- -- --       Output    Urine  --  -- --  --  -- --    Number of Times Voided -- 1 x 1 x -- -- --    Drains  --  5 5  --  -- --    Output (mL) ([REMOVED] Closed/Suction Drain Anterior Neck Hemovac 10 Fr. 23 0536) -- 5 5 -- -- --    Blood  40  -- 40  --  -- --    Est. Blood Loss 40 -- 40 -- -- --    Total Output 40 5 45 -- -- --       Net I/O      -- -- --              Intake/Output Summary (Last 24 hours) at 2023 0811  Last data filed at 2023 0400  Gross per 24 hour   Intake 1320 ml   Output 45 ml   Net 1275 ml               levothyroxine  100 mcg DAILY    morphine ER  30 mg BID    pregabalin  150 mg QHS    Pharmacy Consult Request  1 Each PHARMACY TO DOSE    MD ALERT...DO NOT ADMINISTER NSAIDS or ASPIRIN unless ORDERED By Neurosurgery  1 Each PRN    docusate sodium  100 mg BID    senna-docusate  1 Tablet Nightly    senna-docusate  1 Tablet Q24HRS PRN    polyethylene glycol/lytes  1 Packet BID  PRN    magnesium hydroxide  30 mL QDAY PRN    bisacodyl  10 mg Q24HRS PRN    sodium phosphate  1 Each Once PRN    0.9 % NaCl with KCl 20 mEq 1,000 mL   Continuous    oxyCODONE immediate-release  10 mg Q3HRS PRN    Or    oxyCODONE immediate-release  20 mg Q3HRS PRN    Or    HYDROmorphone  1 mg Q3HRS PRN    diphenhydrAMINE  25 mg Q6HRS PRN    Or    diphenhydrAMINE  25 mg Q6HRS PRN    ondansetron  4 mg Q4HRS PRN    ondansetron  4 mg Q4HRS PRN    promethazine  12.5-25 mg Q4HRS PRN    promethazine  12.5-25 mg Q4HRS PRN    prochlorperazine  5-10 mg Q4HRS PRN    tizanidine  4 mg TID PRN    labetalol  10 mg Q HOUR PRN    hydrALAZINE  10 mg Q HOUR PRN    benzocaine-menthol  1 Lozenge Q2HRS PRN       Assessment and Plan:    Date of admit: 11/16/23  Date of discharge: 11/17/23  Pre op dx: cervical stenosis  Operation: ACD 6-7 placement of artificial disc by Dr Fung on 11/15/23    NM intact  Doing well  Bowel/bladder protocol  Discharge      Pain control- on MS contin and percocet thru pain mgmt Dr Hannah

## 2023-11-16 NOTE — PROGRESS NOTES
Neurosurgery Progress Note    Subjective:  Doing well  Neck pain fairly well controlled  Thinks arm s/s are decreased from pre op  Swallowing w/o diff, eating, voiding, amb    Exam:  Str 5/5  Inc c/d flat , hvac out    BP  Min: 104/60  Max: 150/78  Pulse  Av.9  Min: 73  Max: 107  Resp  Av.2  Min: 11  Max: 31  Temp  Av.7 °C (98.1 °F)  Min: 36.3 °C (97.3 °F)  Max: 37.3 °C (99.1 °F)  SpO2  Av.2 %  Min: 88 %  Max: 99 %    No data recorded                      Intake/Output                         11/15/23 0700 - 2359 23 - 23 0659      Total  Total                 Intake    P.O.  --  320 320  --  -- --    P.O. -- 320 320 -- -- --    I.V.  1000  -- 1000  --  -- --    Volume (mL) (lactated ringers infusion) 1000 -- 1000 -- -- --    Total Intake 0628 602 0027 -- -- --       Output    Urine  --  -- --  --  -- --    Number of Times Voided -- 1 x 1 x -- -- --    Drains  --  5 5  --  -- --    Output (mL) ([REMOVED] Closed/Suction Drain Anterior Neck Hemovac 10 Fr. 23 0536) -- 5 5 -- -- --    Blood  40  -- 40  --  -- --    Est. Blood Loss 40 -- 40 -- -- --    Total Output 40 5 45 -- -- --       Net I/O      -- -- --              Intake/Output Summary (Last 24 hours) at 2023 0809  Last data filed at 2023 0400  Gross per 24 hour   Intake 1320 ml   Output 45 ml   Net 1275 ml             levothyroxine  100 mcg DAILY    morphine ER  30 mg BID    pregabalin  150 mg QHS    Pharmacy Consult Request  1 Each PHARMACY TO DOSE    MD ALERT...DO NOT ADMINISTER NSAIDS or ASPIRIN unless ORDERED By Neurosurgery  1 Each PRN    docusate sodium  100 mg BID    senna-docusate  1 Tablet Nightly    senna-docusate  1 Tablet Q24HRS PRN    polyethylene glycol/lytes  1 Packet BID PRN    magnesium hydroxide  30 mL QDAY PRN    bisacodyl  10 mg Q24HRS PRN    sodium phosphate  1 Each Once PRN    0.9 % NaCl with KCl 20 mEq 1,000 mL   Continuous     oxyCODONE immediate-release  10 mg Q3HRS PRN    Or    oxyCODONE immediate-release  20 mg Q3HRS PRN    Or    HYDROmorphone  1 mg Q3HRS PRN    diphenhydrAMINE  25 mg Q6HRS PRN    Or    diphenhydrAMINE  25 mg Q6HRS PRN    ondansetron  4 mg Q4HRS PRN    ondansetron  4 mg Q4HRS PRN    promethazine  12.5-25 mg Q4HRS PRN    promethazine  12.5-25 mg Q4HRS PRN    prochlorperazine  5-10 mg Q4HRS PRN    tizanidine  4 mg TID PRN    labetalol  10 mg Q HOUR PRN    hydrALAZINE  10 mg Q HOUR PRN    benzocaine-menthol  1 Lozenge Q2HRS PRN       Assessment and Plan:    POD# 1 ACD 6-7 placement of artificial disc  Chemical prophylactic DVT therapy: No  Start date/time: n/a- ambulatory    NM intact  Pain control- on MS contin and percocet thru pain mgmt Dr Hannah  Bowel/bladder protocol  Discharge

## 2023-11-16 NOTE — THERAPY
Occupational Therapy   Initial Evaluation     Patient Name: Shanda Melgoza  Age:  53 y.o., Sex:  male  Medical Record #: [id number]  Today's Date: 11/16/2023     Precautions  Comments: no brace    Assessment  Patient is 53 y.o. male s/p elective C6-C7 ACD disc replacement. Pt edu on spinal precautions, compensatory strategies during ADLs as well as appropriate AE/DME to maximize independence and safety. He is at a supervision level for basic self care, functional mobility, and functional transfers. He denies any further deficits in self care at this time.    Plan    Occupational Therapy Initial Treatment Plan   Duration: Evaluation only    DC Equipment Recommendations: None  Discharge Recommendations: Anticipate that the patient will have no further occupational therapy needs after discharge from the hospital      Objective       11/16/23 0862   Prior Living Situation   Prior Services Home-Independent   Housing / Facility 1 Story House   Bathroom Set up Walk In Shower   Equipment Owned None   Lives with - Patient's Self Care Capacity Spouse   Comments Pt lives with his wife who is home today and tomorrow, and his 19 dogs.   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)   Precautions   Comments no brace   Pain 0 - 10 Group   Therapist Pain Assessment Nurse Notified;During Activity  (min c/o neck pain)   Cognition    Cognition / Consciousness WDL   Level of Consciousness Alert   Comments needs  cues to maintain precautions   Active ROM Upper Body   Active ROM Upper Body  WDL   Strength Upper Body   Upper Body Strength  WDL   Balance Assessment   Sitting Balance (Static) Fair +   Sitting Balance (Dynamic) Fair   Standing Balance (Static) Fair +   Standing Balance (Dynamic) Fair   Weight Shift Sitting Fair   Weight Shift Standing Fair   Comments no AD   Bed Mobility    Supine to Sit Supervised   Scooting Supervised   Rolling Supervised   Comments via log roll   ADL Assessment   Grooming   (declined to practice, educated)   Upper Body Dressing Supervision   Lower Body Dressing Supervision   Toileting   (declined)   Comments educated on spinal precautions during ADLs   How much help from another person does the patient currently need...   Putting on and taking off regular lower body clothing? 3   Bathing (including washing, rinsing, and drying)? 3   Toileting, which includes using a toilet, bedpan, or urinal? 3   Putting on and taking off regular upper body clothing? 4   Taking care of personal grooming such as brushing teeth? 4   Eating meals? 4   6 Clicks Daily Activity Score 21   Functional Mobility   Sit to Stand Supervised   Bed, Chair, Wheelchair Transfer Supervised   Mobility in room   Comments no AD

## 2023-11-21 NOTE — OP REPORT
DATE OF SERVICE:  11/15/2023     PREOPERATIVE DIAGNOSIS:  Cervical radiculopathy.     POSTOPERATIVE DIAGNOSIS:  Cervical radiculopathy.     PROCEDURES:  1.  Anterior cervical 6-7 diskectomy with interbody arthrodesis.  2.  Bilateral neural foraminotomies, cervical 7 roots.  3.  Implantation of Medtronic Prestige LP disk arthroplasty at cervical 6-7.  4.  Use of operative microscope for microdissection.     SURGEON:  Peter Fung MD     ASSISTANT:  BART Morfin     ANESTHESIA:  General.     COMPLICATIONS:  None.     ESTIMATED BLOOD LOSS:  Minimal.     DESCRIPTION OF PROCEDURE:  The patient was brought to the operating room,   identified in the usual fashion.  General endotracheal anesthesia was induced   by the anesthesia team.  The patient was then placed supine on the operating   room table.  All pressure points meticulously padded.  Transverse incision was   marked in the left anterior neck using fluoroscopic guidance.  He was then   prepped and draped in the usual sterile fashion.  Local anesthesia was   infiltrated in subcutaneous tissue.  A 15 blade was used to incise the skin.    Dissection was carried down through platysma using Bovie electrocautery.    Retractor put in place.  We then used sharp dissection to undermine platysma   and identified the medial border of sternocleidomastoid muscle.  We then used   blunt retraction to access the anterior spine.  Spinal needle was placed at   what we be believed to be cervical 6-7.  Film was taken confirming that we   were at the correct level.  This was then marked with a Bovie.  We reflected   longus colli muscles laterally bilaterally and cleaned off the anterior spine   using Bovie.  We then placed  retractors underneath longus colli   then superiorly and inferiorly, and we brought in the operative microscope for   the diskectomy.  We placed [place] pins at cervical 6 and 7 for gentle disk   space distraction.  The 15 blade was used  to incise the disk space.  We then   removed disk contents using an alley, pituitary and upgoing curette.   High-speed air drill was used to drill the anterior and posterior osteophytes,   flushed to the vertebral body. We then performed foraminotomies and removed   all of the osteophytes posteriorly using [place] 1 and 2 punch.  Microsect   curette confirmed we had excellent decompression of the central canal and   bilateral neural foramina, was also used to prepare the endplates. A 6-mm disk   arthroplasty was found to be of appropriate size.  Once the trial was placed,   we then drilled the troughs as required for the disk arthroplasty. Trial was   then removed.  Bone was removed from the troughs using a [employer] hook.    Copious amounts of antibiotic irrigation were used to wash out the wound.    FloSeal gentle tamponade was used for hemostasis.  We then inserted the disk   arthroplasty to the appropriate depth.  Film was taken confirming that we were   at the correct level and the hardware looked to be in excellent position both   AP and lateral.  All retractors were removed.  Hemostasis in longus colli was   achieved with bipolar.  We left a Hemovac drain and closed the incision in   layers and topped with Dermabond.        ______________________________  MD DONAL Leger/SHAN/TOMÁS    DD:  11/20/2023 14:26  DT:  11/20/2023 15:08    Job#:  [id number]

## 2023-11-27 NOTE — DISCHARGE SUMMARY
Neurosurgery discharge summary  Date of admit: 11/15/23  Date of discharge: 11/16/23  Pre op dx: cervical stenosis  Operation: ACD 6-7 placement of artificial disc by Dr Fung on 11/15/23      Subjective:  Doing well  Neck pain fairly well controlled  Thinks arm s/s are decreased from pre op  Swallowing w/o diff, eating, voiding, amb    Exam:  Str 5/5  Inc c/d flat , hvac out    No data recorded    No data recorded                      Intake/Output       None            No intake or output data in the 24 hours ending 11/27/23 1205                 Assessment and Plan:    Date of admit: 11/16/23  Date of discharge: 11/17/23  Pre op dx: cervical stenosis  Operation: ACD 6-7 placement of artificial disc by Dr Fung on 11/15/23    NM intact  Doing well  Bowel/bladder protocol  Discharge      Pain control- on MS contin and percocet thru pain mgmt Dr Hannah

## (undated) DEVICE — SPONGE GAUZESTER. 2X2 4-PL - (2/PK 50PK/BX 30BX/CS)

## (undated) DEVICE — SET EXTENSION WITH 2 PORTS (48EA/CA) ***PART #2C8610 IS A SUBSTITUTE*****

## (undated) DEVICE — SCREW DISTRACTION 14MM YELLOW - STERILE (10EA/BX) (5TX4=20)

## (undated) DEVICE — CORDS BIPOLAR COAGULATION - 12FT STERILE DISP. (10EA/BX)

## (undated) DEVICE — LACTATED RINGERS INJ 1000 ML - (14EA/CA 60CA/PF)

## (undated) DEVICE — NEEDLE SPINAL NON-SAFETY 18 GA X 3 IN (25EA/BX)

## (undated) DEVICE — GLOVE BIOGEL PI INDICATOR SZ 6.0 SURGICAL PF LF -(200PR/CA)

## (undated) DEVICE — SUTURE 3-0 VICRYL PLUS SH - 8X 18 INCH (12/BX)

## (undated) DEVICE — SUTURE GENERAL

## (undated) DEVICE — SUCTION INSTRUMENT YANKAUER BULBOUS TIP W/O VENT (50EA/CA)

## (undated) DEVICE — KIT EVACUATER 3 SPRING PVC LF 1/8 DRAIN SIZE (10EA/CA)"

## (undated) DEVICE — FORCEPS ELECTROSURGICAL DISPOSABLE CODMAN 8IN 1.5MM

## (undated) DEVICE — SODIUM CHL IRRIGATION 0.9% 1000ML (12EA/CA)

## (undated) DEVICE — GOWN WARMING STANDARD FLEX - (30/CA)

## (undated) DEVICE — GLOVE BIOGEL SZ 6.5 SURGICAL PF LTX (50PR/BX 4BX/CA)

## (undated) DEVICE — COVER MAYO STAND X-LG - (22EA/CA)

## (undated) DEVICE — COVER LIGHT HANDLE ALC PLUS DISP (18EA/BX)

## (undated) DEVICE — SENSOR OXIMETER ADULT SPO2 RD SET (20EA/BX)

## (undated) DEVICE — DRESSING TRANSPARENT FILM TEGADERM 4 X 4.75" (50EA/BX)"

## (undated) DEVICE — BLADE SURGICAL #15 - (50/BX 3BX/CA)

## (undated) DEVICE — ELECTRODE DUAL RETURN W/ CORD - (50/PK)

## (undated) DEVICE — TUBING CLEARLINK DUO-VENT - C-FLO (48EA/CA)

## (undated) DEVICE — BOVIE BLADE COATED &INSULATED - 25/PK

## (undated) DEVICE — SYRINGE NON SAFETY 10 CC 20 GA X 1-1/2 IN (100/BX 4BX/CA)

## (undated) DEVICE — SPONGE PEANUT - (5/PK 50PK/CA)

## (undated) DEVICE — GLOVE BIOGEL INDICATOR SZ 7SURGICAL PF LTX - (50/BX 4BX/CA)

## (undated) DEVICE — SET LEADWIRE 5 LEAD BEDSIDE DISPOSABLE ECG (1SET OF 5/EA)

## (undated) DEVICE — GLOVE BIOGEL PI ORTHO SZ 6 SURGICAL PF LF (40PR/BX)

## (undated) DEVICE — PACK NEURO - (2EA/CA)

## (undated) DEVICE — TOOL MR8 14CM MATCH HD SYM-TRI 3MM DIAMETER (1/EA)

## (undated) DEVICE — TOWEL STOP TIMEOUT SAFETY FLAG (40EA/CA)

## (undated) DEVICE — KIT SURGIFLO W/OUT THROMBIN - (6EA/CA)

## (undated) DEVICE — GOWN SURGICAL XX-LARGE - (28EA/CA) SIRUS NON REINFORCED

## (undated) DEVICE — CHLORAPREP 26 ML APPLICATOR - ORANGE TINT(25/CA)

## (undated) DEVICE — DRAPE MICROSCOPE ARMATEC 120IN X 46IN (10EA/CA)

## (undated) DEVICE — DRAPE 36X28IN RAD CARM BND BG - (25/CA) O

## (undated) DEVICE — CANISTER SUCTION 3000ML MECHANICAL FILTER AUTO SHUTOFF MEDI-VAC NONSTERILE LF DISP  (40EA/CA)

## (undated) DEVICE — RESTRAINTS LIMB DISP. - (12/BX 4BX/CA)

## (undated) DEVICE — SHEET THYROID - (10EA/CA)

## (undated) DEVICE — GLOVE BIOGEL SZ 8.5 SURGICAL PF LTX - (50PR/BX 4BX/CA)

## (undated) DEVICE — SUTURE 4-0 MONOCRYL PLUS PS-2 - 27 INCH (36/BX)

## (undated) DEVICE — SLEEVE, VASO, THIGH, MED